# Patient Record
Sex: MALE | Race: WHITE | NOT HISPANIC OR LATINO | Employment: FULL TIME | URBAN - METROPOLITAN AREA
[De-identification: names, ages, dates, MRNs, and addresses within clinical notes are randomized per-mention and may not be internally consistent; named-entity substitution may affect disease eponyms.]

---

## 2022-12-22 ENCOUNTER — ANCILLARY PROCEDURE (OUTPATIENT)
Dept: GENERAL RADIOLOGY | Facility: CLINIC | Age: 32
End: 2022-12-22
Attending: NURSE PRACTITIONER
Payer: COMMERCIAL

## 2022-12-22 ENCOUNTER — OFFICE VISIT (OUTPATIENT)
Dept: FAMILY MEDICINE | Facility: CLINIC | Age: 32
End: 2022-12-22
Payer: COMMERCIAL

## 2022-12-22 VITALS
RESPIRATION RATE: 16 BRPM | HEART RATE: 84 BPM | DIASTOLIC BLOOD PRESSURE: 80 MMHG | OXYGEN SATURATION: 98 % | WEIGHT: 257.2 LBS | BODY MASS INDEX: 34.09 KG/M2 | HEIGHT: 73 IN | SYSTOLIC BLOOD PRESSURE: 142 MMHG

## 2022-12-22 DIAGNOSIS — S89.81XD HYPEREXTENSION INJURY OF RIGHT KNEE, SUBSEQUENT ENCOUNTER: Primary | ICD-10-CM

## 2022-12-22 DIAGNOSIS — M25.561 RIGHT KNEE PAIN: ICD-10-CM

## 2022-12-22 PROBLEM — J30.2 SEASONAL ALLERGIC RHINITIS: Status: ACTIVE | Noted: 2022-12-22

## 2022-12-22 PROBLEM — K21.9 ACID REFLUX: Status: ACTIVE | Noted: 2022-12-22

## 2022-12-22 PROBLEM — E03.9 HYPOTHYROIDISM: Status: ACTIVE | Noted: 2022-12-22

## 2022-12-22 PROCEDURE — 90471 IMMUNIZATION ADMIN: CPT | Performed by: NURSE PRACTITIONER

## 2022-12-22 PROCEDURE — 99203 OFFICE O/P NEW LOW 30 MIN: CPT | Mod: 25 | Performed by: NURSE PRACTITIONER

## 2022-12-22 PROCEDURE — 73560 X-RAY EXAM OF KNEE 1 OR 2: CPT | Mod: TC | Performed by: RADIOLOGY

## 2022-12-22 PROCEDURE — 90715 TDAP VACCINE 7 YRS/> IM: CPT | Performed by: NURSE PRACTITIONER

## 2022-12-22 PROCEDURE — 90686 IIV4 VACC NO PRSV 0.5 ML IM: CPT | Performed by: NURSE PRACTITIONER

## 2022-12-22 PROCEDURE — 90472 IMMUNIZATION ADMIN EACH ADD: CPT | Performed by: NURSE PRACTITIONER

## 2022-12-22 RX ORDER — LEVOTHYROXINE SODIUM 25 UG/1
TABLET ORAL
COMMUNITY
Start: 2022-10-01

## 2022-12-22 ASSESSMENT — PAIN SCALES - GENERAL: PAINLEVEL: MODERATE PAIN (5)

## 2022-12-22 NOTE — PATIENT INSTRUCTIONS
X-ray looks good  MRI ordered for further evaluation.  Make appointment with radiology at [phone].  I will notify you of results when the radiologist reads the films.  Use compression sleeve, ice/heat as needed, rest and elevation if swelling.

## 2022-12-22 NOTE — PROGRESS NOTES
Assessment & Plan     (S89.81XD) Hyperextension injury of right knee, subsequent encounter  (primary encounter diagnosis)  Comment: Patient recently moved from [place] so no available records of knee injury. Patient stated that when he initially injured his knee, he had an x-ray completed which was normal. Unable to access these records at this time. Because pain has not improved, and no access to previous records, standing right knee x-ray was completed which appeared normal besides a noted bone spur to medial tibia which patient appearts to have previously have knowledge about. MRI without contrast ordered for further evaluation. Educated patient on the use of heat or ice to help relieve symptoms along with applying a compression sleeve to know. Also encouraged patient to rest and elevate extremity. Will follow up with patient regarding MRI results once completed.   Plan: MR Knee Right w/o Contrast; XR KNEE STANDING RIGHT 2 VIEWS [Ordered]    Return if symptoms worsen or fail to improve.    Neela Awan, FNP-Student    Henny Marcus NP  [facility]    I, Henny Marcus, was present with the NP student who participated in the service and in the documentationof the note.   I have verified the history and personally performed the physical exam and medical decision making.  I agree with the assessment and plan of care as documented in the note.     Henny Marcus NP on 12/22/2022 at 2:38 PM      Jimmie De La Rosa is a 32 year old, presenting for the following health issues:  Knee Pain      History of Present Illness       Reason for visit:  Knee injury  Symptom onset:  More than a month  Symptoms include:  Knee pain  Symptom intensity:  Moderate  Symptom progression:  Staying the same  Had these symptoms before:  No  What makes it worse:  Stairs sitting laying down applying force while bent    He eats 0-1 servings of fruits and vegetables daily.He consumes 0 sweetened beverage(s)  "daily.He exercises with enough effort to increase his heart rate 9 or less minutes per day.  He exercises with enough effort to increase his heart rate 3 or less days per week. He is missing 2 dose(s) of medications per week.     Hyperextended right knee in August while dirt biking. [place] a pop. Had x-rays done at that time in [place] that showed nothing significant (unable to access records at this time). Was on crutches for a week and still has pain going down stairs. The pain is diffuse and Hurt when lying and in bed, wakes up from the pain. Worst when he is walking down the stairs. Nothing has made it feel better but hasn't done any supportive measures for his knee. Reports he cannot completely flex knee but declines redness, swelling or warmth to the knee. Dislocated right knee in the past. Never had knee surgery on it. Declines feeling instability or crunching of his knee.     Review of Systems   CONSTITUTIONAL: NEGATIVE for fever, chills, change in weight  MUSCULOSKELETAL:POSITIVE  for joint pain right knee      Objective    BP (!) 142/80   Pulse 84   Resp 16   Ht 1.854 m (6' 1\")   Wt 116.7 kg (257 lb 3.2 oz)   SpO2 98%   BMI 33.93 kg/m    Body mass index is 33.93 kg/m .  Physical Exam   GENERAL: healthy, alert and no distress  MS: RLE exam shows no evidence of joint instability, negative mcmurrays test, anterior and posterior drawer test, vargus and valgus. No warmth, swelling or redness noted to right knee and no tenderness with palpation.   SKIN: no suspicious lesions or rashes    Xray - Reviewed and interpreted by me.  Normal appearing knee with adequate joint spacing. Bone spur located to medial tibia.           "

## 2023-01-10 ENCOUNTER — HOSPITAL ENCOUNTER (OUTPATIENT)
Dept: MRI IMAGING | Facility: CLINIC | Age: 33
Discharge: HOME | End: 2023-01-10
Attending: NURSE PRACTITIONER | Admitting: NURSE PRACTITIONER
Payer: COMMERCIAL

## 2023-01-10 DIAGNOSIS — S89.81XD HYPEREXTENSION INJURY OF RIGHT KNEE, SUBSEQUENT ENCOUNTER: ICD-10-CM

## 2023-01-10 PROCEDURE — 73721 MRI JNT OF LWR EXTRE W/O DYE: CPT | Mod: RT

## 2023-01-10 PROCEDURE — 73721 MRI JNT OF LWR EXTRE W/O DYE: CPT | Mod: 26 | Performed by: RADIOLOGY

## 2023-01-12 ENCOUNTER — TELEPHONE (OUTPATIENT)
Dept: ORTHOPEDICS | Facility: CLINIC | Age: 33
End: 2023-01-12

## 2023-01-12 DIAGNOSIS — S83.411A COMPLETE TEAR OF MCL OF KNEE, RIGHT, INITIAL ENCOUNTER: Primary | ICD-10-CM

## 2023-01-12 DIAGNOSIS — S83.281S OTHER TEAR OF LATERAL MENISCUS OF RIGHT KNEE AS CURRENT INJURY, SEQUELA: ICD-10-CM

## 2023-01-12 DIAGNOSIS — S83.511S RUPTURE OF ANTERIOR CRUCIATE LIGAMENT OF RIGHT KNEE, SEQUELA: Primary | ICD-10-CM

## 2023-01-12 NOTE — TELEPHONE ENCOUNTER
Reviewed patient's chart prior to his appointment on 1/17/2023 with Sports Medicine - Dr Rudolph for his right knee injury.  Patient has a subacute near complete tear of the ACL with a lateral meniscus tear as well.  This young active individual that would be best managed with surgical consultation at this time.    Spoke to patient discussed above.  Recommend scheduling with Nathanael Lynch @ [facility].  Updated Orthopedic  order was placed and scheduling information provided.    Appointment with Dr Rudolph for 1/17/23 was cancelled.    Kendrick Aragon ATC

## 2023-01-20 ENCOUNTER — TRANSCRIBE ORDERS (OUTPATIENT)
Dept: OTHER | Age: 33
End: 2023-01-20

## 2023-01-20 DIAGNOSIS — Z98.890 S/P RIGHT KNEE SURGERY: Primary | ICD-10-CM

## 2023-02-12 ENCOUNTER — HEALTH MAINTENANCE LETTER (OUTPATIENT)
Age: 33
End: 2023-02-12

## 2023-03-01 ENCOUNTER — OFFICE VISIT (OUTPATIENT)
Dept: FAMILY MEDICINE | Facility: CLINIC | Age: 33
End: 2023-03-01
Payer: COMMERCIAL

## 2023-03-01 VITALS
BODY MASS INDEX: 33.4 KG/M2 | HEIGHT: 73 IN | HEART RATE: 61 BPM | DIASTOLIC BLOOD PRESSURE: 80 MMHG | OXYGEN SATURATION: 97 % | RESPIRATION RATE: 20 BRPM | TEMPERATURE: 96.9 F | WEIGHT: 252 LBS | SYSTOLIC BLOOD PRESSURE: 110 MMHG

## 2023-03-01 DIAGNOSIS — Z01.818 PREOP GENERAL PHYSICAL EXAM: Primary | ICD-10-CM

## 2023-03-01 DIAGNOSIS — E61.1 LOW IRON: ICD-10-CM

## 2023-03-01 DIAGNOSIS — E03.9 HYPOTHYROIDISM, UNSPECIFIED TYPE: ICD-10-CM

## 2023-03-01 DIAGNOSIS — S83.511D RUPTURE OF ANTERIOR CRUCIATE LIGAMENT OF RIGHT KNEE, SUBSEQUENT ENCOUNTER: ICD-10-CM

## 2023-03-01 LAB
FERRITIN SERPL-MCNC: 96 NG/ML (ref 31–409)
HGB BLD-MCNC: 16.7 G/DL (ref 13.3–17.7)
IRON BINDING CAPACITY (ROCHE): 352 UG/DL (ref 240–430)
IRON SATN MFR SERPL: 37 % (ref 15–46)
IRON SERPL-MCNC: 129 UG/DL (ref 61–157)
TSH SERPL DL<=0.005 MIU/L-ACNC: 2.62 UIU/ML (ref 0.3–4.2)

## 2023-03-01 PROCEDURE — 83550 IRON BINDING TEST: CPT | Performed by: FAMILY MEDICINE

## 2023-03-01 PROCEDURE — 84443 ASSAY THYROID STIM HORMONE: CPT | Performed by: FAMILY MEDICINE

## 2023-03-01 PROCEDURE — 83540 ASSAY OF IRON: CPT | Performed by: FAMILY MEDICINE

## 2023-03-01 PROCEDURE — 99214 OFFICE O/P EST MOD 30 MIN: CPT | Performed by: FAMILY MEDICINE

## 2023-03-01 PROCEDURE — 85018 HEMOGLOBIN: CPT | Performed by: FAMILY MEDICINE

## 2023-03-01 PROCEDURE — 82728 ASSAY OF FERRITIN: CPT | Performed by: FAMILY MEDICINE

## 2023-03-01 PROCEDURE — 36415 COLL VENOUS BLD VENIPUNCTURE: CPT | Performed by: FAMILY MEDICINE

## 2023-03-01 RX ORDER — IRON ASPGLY/C/B12/FA/CA-TH/SUC 70-150-1MG
1 TABLET ORAL DAILY
COMMUNITY

## 2023-03-01 ASSESSMENT — PAIN SCALES - GENERAL: PAINLEVEL: NO PAIN (0)

## 2023-03-01 NOTE — PATIENT INSTRUCTIONS
Lab work today.     Follow up after surgery for annual exam    Best of luck with upcoming surgery.         For informational purposes only. Not to replace the advice of your health care provider. Copyright   ,  [facility] [employer] [facility]. All rights reserved. Clinically reviewed by Jody Allen MD. [employer] [id number] - REV .  Preparing for Your Surgery  Getting started  A nurse will call you to review your health history and instructions. They will give you an arrival time based on your scheduled surgery time. Please be ready to share:  Your doctor's clinic name and phone number  Your medical, surgical, and anesthesia history  A list of allergies and sensitivities  A list of medicines, including herbal treatments and over-the-counter drugs  Whether the patient has a legal guardian (ask how to send us the papers in advance)  Please tell us if you're pregnant--or if there's any chance you might be pregnant. Some surgeries may injure a fetus (unborn baby), so they require a pregnancy test. Surgeries that are safe for a fetus don't always need a test, and you can choose whether to have one.   If you have a child who's having surgery, please ask for a copy of Preparing for Your Child's Surgery.    Preparing for surgery  Within 10 to 30 days of surgery: Have a pre-op exam (sometimes called an H&P, or History and Physical). This can be done at a clinic or pre-operative center.  If you're having a , you may not need this exam. Talk to your care team.  At your pre-op exam, talk to your care team about all medicines you take. If you need to stop any medicines before surgery, ask when to start taking them again.  We do this for your safety. Many medicines can make you bleed too much during surgery. Some change how well surgery (anesthesia) drugs work.  Call your insurance company to let them know you're having surgery. (If you don't have insurance, call [phone].)  Call your clinic if there's any change  in your health. This includes signs of a cold or flu (sore throat, runny nose, cough, rash, fever). It also includes a scrape or scratch near the surgery site.  If you have questions on the day of surgery, call your hospital or surgery center.  Eating and drinking guidelines  For your safety: Unless your surgeon tells you otherwise, follow the guidelines below.  Eat and drink as usual until 8 hours before you arrive for surgery. After that, no food or milk.  Drink clear liquids until 2 hours before you arrive. These are liquids you can see through, like water, Gatorade, and Propel Water. They also include plain black coffee and tea (no cream or milk), candy, and breath mints. You can spit out gum when you arrive.  If you drink alcohol: Stop drinking it the night before surgery.  If your care team tells you to take medicine on the morning of surgery, it's okay to take it with a sip of water.  Preventing infection  Shower or bathe the night before and morning of your surgery. Follow the instructions your clinic gave you. (If no instructions, use regular soap.)  Don't shave or clip hair near your surgery site. We'll remove the hair if needed.  Don't smoke or vape the morning of surgery. You may chew nicotine gum up to 2 hours before surgery. A nicotine patch is okay.  Note: Some surgeries require you to completely quit smoking and nicotine. Check with your surgeon.  Your care team will make every effort to keep you safe from infection. We will:  Clean our hands often with soap and water (or an alcohol-based hand rub).  Clean the skin at your surgery site with a special soap that kills germs.  Give you a special gown to keep you warm. (Cold raises the risk of infection.)  Wear special hair covers, masks, gowns and gloves during surgery.  Give antibiotic medicine, if prescribed. Not all surgeries need antibiotics.  What to bring on the day of surgery  Photo ID and insurance card  Copy of your health care directive, if  you have one  Glasses and hearing aids (bring cases)  You can't wear contacts during surgery  Inhaler and eye drops, if you use them (tell us about these when you arrive)  CPAP machine or breathing device, if you use them  A few personal items, if spending the night  If you have . . .  A pacemaker, ICD (cardiac defibrillator) or other implant: Bring the ID card.  An implanted stimulator: Bring the remote control.  A legal guardian: Bring a copy of the certified (court-stamped) guardianship papers.  Please remove any jewelry, including body piercings. Leave jewelry and other valuables at home.  If you're going home the day of surgery  You must have a responsible adult drive you home. They should stay with you overnight as well.  If you don't have someone to stay with you, and you aren't safe to go home alone, we may keep you overnight. Insurance often won't pay for this.  After surgery  If it's hard to control your pain or you need more pain medicine, please call your surgeon's office.  Questions?   If you have any questions for your care team, list them here: _________________________________________________________________________________________________________________________________________________________________________ ____________________________________ ____________________________________ ____________________________________

## 2023-03-23 ENCOUNTER — HOSPITAL ENCOUNTER (OUTPATIENT)
Dept: PHYSICAL THERAPY | Facility: CLINIC | Age: 33
Setting detail: THERAPIES SERIES
Discharge: HOME | End: 2023-03-23
Attending: ORTHOPAEDIC SURGERY
Payer: COMMERCIAL

## 2023-03-23 PROCEDURE — 97161 PT EVAL LOW COMPLEX 20 MIN: CPT | Mod: GP | Performed by: PHYSICAL THERAPIST

## 2023-03-23 PROCEDURE — 97110 THERAPEUTIC EXERCISES: CPT | Mod: GP | Performed by: PHYSICAL THERAPIST

## 2023-03-23 NOTE — PROGRESS NOTES
03/23/23 1300   General Information   Type of Visit Initial OP Ortho PT Evaluation   Start of Care Date 03/23/23   Referring Physician Adiel Simmons   Patient/Family Goals Statement back to dirt biking.   Orders Evaluate and Treat   Date of Order 01/19/23   Medical Diagnosis s/p right ACL reconstruction hamstring Allograft 3/20/23   Surgical/Medical history reviewed Yes   Precautions/Limitations no known precautions/limitations   General Information Comments PMH:  none   Body Part(s)   Body Part(s) Knee   Presentation and Etiology   Pertinent history of current problem (include personal factors and/or comorbidities that impact the POC) Dirt biking  in [facility] and tore his ACL back in September. Was doing some exercises and biking before surgery to strengthen things up.  Had surgery on the 20th for ACL repair.  Had a Hamstring Allograft.  Surgery went well. No signs of blood clots.   Impairments A. Pain;B. Decreased WB tolerance;C. Swelling;D. Decreased ROM;E. Decreased flexibility;F. Decreased strength and endurance;H. Impaired gait   Functional Limitations perform activities of daily living;perform desired leisure / sports activities   Symptom Location right knee   How/Where did it occur During recreation/sports   Onset date of current episode/exacerbation 03/20/23   Chronicity New   Pain rating (0-10 point scale) Best (/10);Worst (/10)   Best (/10) 1   Worst (/10) 6   Pain quality A. Sharp;C. Aching   Frequency of pain/symptoms B. Intermittent   Pain/symptoms are: Worse during the day   Progression of symptoms since onset: Improved   Current Level of Function   Patient role/employment history A. Employed   Employment Comments    Fall Risk Screen   Fall screen completed by PT   Have you fallen 2 or more times in the past year? No   Have you fallen and had an injury in the past year? No   Is patient a fall risk? No   Abuse Screen (yes response referral indicated)   Feels Unsafe at Home or Work/School no    Feels Threatened by Someone no   Does Anyone Try to Keep You From Having Contact with Others or Doing Things Outside Your Home? no   Physical Signs of Abuse Present no   Functional Scales   Functional Scales Other   Other Scales  LEFS   Knee Objective Findings   Gait/Locomotion using crutch in left hand, step through gait pattern WBAT, good heel/toe pattern with knee flexion during swing phase.  Pt does relate it is buckling still at times.   Integumentary  incisions healing well, steri strips present with dried blood around steristrips. No apparent bruising noted today   Side (if bilateral, select both right and left) Right   Right Knee Extension AROM 4 from 0   Right Knee Flexion AROM 78   Right Knee Flexion Strength 2/5   Right Knee Extension Strength 2/5   Right Quad Set Strength fair isometric contraction   Right Gastrocnemius Flexibility mod tightness right   Right Hamstring Flexibility mod tightness right   Planned Therapy Interventions   Planned Therapy Interventions balance training;bed mobility training;gait training;joint mobilization;manual therapy;neuromuscular re-education;ROM;strengthening;stretching;transfer training   Planned Modality Interventions   Planned Modality Interventions Electrical stimulation   Clinical Impression   Criteria for Skilled Therapeutic Interventions Met yes, treatment indicated   PT Diagnosis decreased right knee ROM s/p ACL repair 3/20   Influenced by the following impairments decreased right knee ROM and strength, pain, post op swelling,   Functional limitations due to impairments walking, stairs, squatting, lifting, pusihng, dirt biking   Clinical Presentation Stable/Uncomplicated   Clinical Presentation Rationale clinical decision making and chart review   Clinical Decision Making (Complexity) Low complexity   Therapy Frequency 1 time/week   Predicted Duration of Therapy Intervention (days/wks) 12 weeks   Risk & Benefits of therapy have been explained Yes   Patient,  Family & other staff in agreement with plan of care Yes   Clinical Impression Comments Rj is a 32 year old male who presents to PT s/p RIght ACL reconstruction hamstring allograft 3/20/23. Patient would benefit from skilled PT intervention to improve impairments listed above. PT prognosis is great due to pt age, motivation and current function 3 days post op.   Education Assessment   Preferred Learning Style Listening;Demonstration;Pictures/video   Barriers to Learning No barriers   ORTHO GOALS   PT Ortho Eval Goals 1;2;3;4;5;6   Ortho Goal 1   Goal Identifier 1   Goal Description Patient will improve right knee ROM to 0-135 in order to restore normal ROM.   Target Date 04/20/23   Ortho Goal 2   Goal Identifier 2   Goal Description Patient will be able to complete SLR with no knee ext lag x 10 in order to improve strength for normal gait.   Target Date 04/20/23   Ortho Goal 3   Goal Identifier 3   Goal Description Patient will be able to ambulate with normal heel/toe pattern and no crutches with no buckling in the right knee.   Target Date 04/20/23   Ortho Goal 4   Goal Identifier 4   Goal Description Patient will improve right knee ext strength to 5-/5 in order to return to daily activities and recreational activites such as dirt biking.   Target Date 06/01/23   Ortho Goal 5   Goal Identifier 5   Goal Description Patient will be able to ascend/descend flight of stairs with recirpocal gait pattern and no railing.   Target Date 06/01/23   Ortho Goal 6   Goal Identifier 6   Goal Description Patient will test within 85% of left leg in return to fitness level 1 and Level 2 functional performance testing (SL stand and reach, SL balance, SL squat, Retro step, star excursion, SL squat SL hop)   Target Date 06/15/23   Total Evaluation Time   PT Eval, Low Complexity Minutes (70531) 8     Lynsey Sweeney  PT, DPT       3/23/2023   [address] [address]    [email or web address]  Voicemail: [phone]

## 2023-03-30 ENCOUNTER — HOSPITAL ENCOUNTER (OUTPATIENT)
Dept: PHYSICAL THERAPY | Facility: CLINIC | Age: 33
Setting detail: THERAPIES SERIES
Discharge: HOME | End: 2023-03-30
Attending: ORTHOPAEDIC SURGERY
Payer: COMMERCIAL

## 2023-03-30 PROCEDURE — 97116 GAIT TRAINING THERAPY: CPT | Mod: GP | Performed by: PHYSICAL THERAPIST

## 2023-03-30 PROCEDURE — 97110 THERAPEUTIC EXERCISES: CPT | Mod: GP | Performed by: PHYSICAL THERAPIST

## 2023-04-06 ENCOUNTER — HOSPITAL ENCOUNTER (OUTPATIENT)
Dept: PHYSICAL THERAPY | Facility: CLINIC | Age: 33
Setting detail: THERAPIES SERIES
Discharge: HOME | End: 2023-04-06
Attending: ORTHOPAEDIC SURGERY
Payer: COMMERCIAL

## 2023-04-06 PROCEDURE — 97110 THERAPEUTIC EXERCISES: CPT | Mod: GP | Performed by: PHYSICAL THERAPIST

## 2023-04-20 ENCOUNTER — HOSPITAL ENCOUNTER (OUTPATIENT)
Dept: PHYSICAL THERAPY | Facility: CLINIC | Age: 33
Setting detail: THERAPIES SERIES
Discharge: HOME | End: 2023-04-20
Attending: ORTHOPAEDIC SURGERY
Payer: COMMERCIAL

## 2023-04-20 PROCEDURE — 97110 THERAPEUTIC EXERCISES: CPT | Mod: GP | Performed by: PHYSICAL THERAPIST

## 2023-04-27 ENCOUNTER — HOSPITAL ENCOUNTER (OUTPATIENT)
Dept: PHYSICAL THERAPY | Facility: CLINIC | Age: 33
Setting detail: THERAPIES SERIES
Discharge: HOME | End: 2023-04-27
Attending: ORTHOPAEDIC SURGERY
Payer: COMMERCIAL

## 2023-04-27 PROCEDURE — 97110 THERAPEUTIC EXERCISES: CPT | Mod: GP | Performed by: PHYSICAL THERAPIST

## 2023-05-04 ENCOUNTER — HOSPITAL ENCOUNTER (OUTPATIENT)
Dept: PHYSICAL THERAPY | Facility: CLINIC | Age: 33
Setting detail: THERAPIES SERIES
Discharge: HOME | End: 2023-05-04
Attending: ORTHOPAEDIC SURGERY
Payer: COMMERCIAL

## 2023-05-04 PROCEDURE — 97110 THERAPEUTIC EXERCISES: CPT | Mod: GP | Performed by: PHYSICAL THERAPIST

## 2023-05-04 NOTE — PROGRESS NOTES
[facility] Rehabilitation Service    Outpatient Physical Therapy Progress Note  Patient: Rj Phillips  : 1990    Beginning/End Dates of Reporting Period:  3/23/23 to 23    Referring Provider: Adiel Muniz Diagnosis: decreased right knee ROM s/p ACL repair 3/20/23     Client Self Report: Knee is doing well.  IF he does too much one day, will notice some increased soreness the next day but better after a day of taking it easy.  Strength feels it's coming along nicely.  Algerian deadlift starting to feel easy now. Can go down the stairs one after another now.    Objective Measurements:  Objective Measure: right knee ROM  Details: 5-0-143  Objective Measure: SL bridge  Details: R 23  L 30  Objective Measure: SL heel raises  Details: right: 27,  left 30  Objective Measure: SL balance  Details: right: 60 seconds   Left: 60 seconds  Objective Measure: single leg stand and reach  Details: right leg: anterior/medial 24 icnhes  anterior/lateral 23 inches     Left leg: anterior medial 27 inches  anterior/lateral 27 inches     Goals:  Goal Identifier 1   Goal Description Patient will improve right knee ROM to 0-135 in order to restore normal ROM.   Target Date 23   Date Met  23   Progress (detail required for progress note):       Goal Identifier 2   Goal Description Patient will be able to complete SLR with no knee ext lag x 10 in order to improve strength for normal gait.   Target Date 23   Date Met  23   Progress (detail required for progress note):       Goal Identifier 3   Goal Description Patient will be able to ambulate with normal heel/toe pattern and no crutches with no buckling in the right knee.   Target Date 23   Date Met  23   Progress (detail required for progress note):       Goal Identifier 4   Goal Description Patient will improve right knee ext strength to 5-/5 in order  to return to daily activities and recreational activites such as dirt biking.   Target Date 06/01/23   Date Met      Progress (detail required for progress note):       Goal Identifier 5   Goal Description Patient will be able to ascend/descend flight of stairs with recirpocal gait pattern and no railing.   Target Date 06/01/23   Date Met      Progress (detail required for progress note):       Goal Identifier 6   Goal Description Patient will test within 85% of left leg in return to fitness level 1 and Level 2 functional performance testing (SL stand and reach, SL balance, SL squat, Retro step, star excursion, SL squat SL hop)   Target Date 06/15/23   Date Met      Progress (detail required for progress note):         Plan:  Continue therapy per current plan of care.  Rj's ROM is looking great and back to WNL.  HIs strength is progressing along well.  Balance is symmetrical with SL balance, Strength testing today showed more weakness on the R than the L with SL bridge, lunges, and SL stand and reach testing. Patient would benefit from continued PT for further strengt training. Has f/u with MD on 5/8    Discharge:  No    Lynsey Sweeney  PT, DPT       5/4/2023   [address] [address]   [email or web address]  Voicemail: [phone]

## 2023-05-18 ENCOUNTER — THERAPY VISIT (OUTPATIENT)
Dept: PHYSICAL THERAPY | Facility: CLINIC | Age: 33
End: 2023-05-18
Attending: ORTHOPAEDIC SURGERY
Payer: COMMERCIAL

## 2023-05-18 DIAGNOSIS — Z98.890 S/P RIGHT KNEE SURGERY: ICD-10-CM

## 2023-05-18 PROCEDURE — 97110 THERAPEUTIC EXERCISES: CPT | Mod: GP | Performed by: PHYSICAL THERAPIST

## 2023-06-15 ENCOUNTER — THERAPY VISIT (OUTPATIENT)
Dept: PHYSICAL THERAPY | Facility: CLINIC | Age: 33
End: 2023-06-15
Attending: ORTHOPAEDIC SURGERY
Payer: COMMERCIAL

## 2023-06-15 DIAGNOSIS — Z98.890 S/P RIGHT KNEE SURGERY: Primary | ICD-10-CM

## 2023-06-15 PROCEDURE — 97110 THERAPEUTIC EXERCISES: CPT | Mod: GP | Performed by: PHYSICAL THERAPIST

## 2023-06-26 PROBLEM — Z98.890 S/P RIGHT KNEE SURGERY: Status: RESOLVED | Noted: 2023-05-18 | Resolved: 2023-06-26

## 2023-06-26 NOTE — PROGRESS NOTES
PHYSICAL THERAPY DISCHARGE NOTE    Assessment:  Pt is back to completing all his normal activities again. He has no desire to get back to sports related activities other than dirt biking. He plans to keep working on his strength and power independently with his HEP. Will plan for patient to return only if needed and if further issues with knee with return to normal activities.      DISCHARGE  Reason for Discharge: Patient has met all goals.    Equipment Issued: theraband    Discharge Plan: Patient to continue home program.    Referring Provider:  Adiel Simmons       06/15/23 0500   Appointment Info   Signing clinician's name / credentials Lynsey Sweeney, PT DPT   Visits Used 8   Medical Diagnosis s/p right ACL reconstruction 3/20/23   PT Tx Diagnosis decreased right knee ROM   Progress Note/Certification   Progress Note Due Date 06/15/23   GOALS   PT Goals 2;3;4;5;6   PT Goal 1   Goal Identifier 1   Goal Description Patient will improve right knee ROM to 0-135 in order to restore normal ROM.   Target Date 04/20/23   Date Met 04/20/23   PT Goal 2   Goal Identifier 2   Goal Description Patient will be able to complete SLR with no knee ext lag x 10 in order to improve strength for normal gait.   Target Date 04/20/23   Date Met 04/20/23   PT Goal 3   Goal Identifier 3   Goal Description Patient will be able to ambulate with normal heel/toe pattern and no crutches with no buckling in the right knee.   Target Date 04/20/23   Date Met 04/20/23   PT Goal 4   Goal Identifier 4   Goal Description Patient will improve right knee ext strength to 5-/5 in order to return to daily activities and recreational activites such as dirt biking.   Target Date 06/01/23   Date Met 06/15/23   PT Goal 5   Goal Identifier 5   Goal Description Patient will be able to ascend/descend flight of stairs with recirpocal gait pattern and no railing.   Target Date 06/01/23   Date Met 06/15/23   PT Goal 6   Goal Identifier 6   Goal Description  Patient will test within 85% of left leg in return to fitness level 1 and Level 2 functional performance testing (SL stand and reach, SL balance, SL squat, Retro step, star excursion, SL squat SL hop)   Target Date 06/15/23   Subjective Report   Subjective Report He hasn't been as good about doing his exercises this past month.  Has been back to riding his dirt bike and that's been going well.   Objective Measures   Objective Measures Objective Measure 1;Objective Measure 2;Objective Measure 3;Objective Measure 4;Objective Measure 5   Objective Measure 1   Objective Measure jumping   Details lands even and pushes oven even   Objective Measure 2   Objective Measure SL hops on right   Details hips adducts on right with landing, okay form at knee, able to correct with cueing   Objective Measure 3   Objective Measure jogging   Details WNL   Objective Measure 4   Objective Measure sprinting   Details right knee vahe but no pain   Treatment Interventions (PT)   Interventions Therapeutic Procedure/Exercise;Gait Training   Therapeutic Procedure/Exercise   Therapeutic Procedures: strength, endurance, ROM, flexibillity minutes (86849) 26   Therapeutic Procedures Ther Proc 2;Ther Proc 3   Ther Proc 1 bike level 4 x 3 minutes   Ther Proc 2 jogging   Ther Proc 2 - Details jogging in gym with good form x 60 seconds for strengthening   Ther Proc 3 Monster Walk   Ther Proc 3 - Details gtb x 30 feet forward   PTRx Ther Proc 1 Star Exercise   PTRx Ther Proc 1 - Details verbally reviewed to continue with   PTRx Ther Proc 2 Prone Plank   PTRx Ther Proc 2 - Details verbally reviewed to continue with   PTRx Ther Proc 3 Double Leg Cone Jumps   PTRx Ther Proc 3 - Details 3 x 10 with cues on lanidng back on heels   PTRx Ther Proc 4 Single Leg Hop Forward   PTRx Ther Proc 4 - Details 3 x 5 on right with cues to keep hips level and land with weight shifted back on heel   PTRx Ther Proc 5 Functional Lunge Forward   PTRx Ther Proc 5 -  Details ed to complete lunges and push back rather than walking lunges   PTRx Ther Proc 6 squat jumps   PTRx Ther Proc 6 - Details 3 x 10 with cues to land with weight on heels   Skilled Intervention progress HEP into strengthening program   Patient Response/Progress understood, good form and understanding, fatigue by end of session.   Ther Proc 1 - Details instructed patient to do short sprints on bike under higher resistance to improve power   Education   Learner/Method Patient   Education Comments educated on role of PT, POC and HEP   Plan   Home program PTrX code in snap shot   Plan for next session Pt is back to completing all his normal activities again. He has no desire to get back to sports related activities other than dirt biking. He plans to keep working on his strength and power independently with his HEP. Will plan for patient to return only if needed and if further issues with knee with return to normal activities.   Comments   Comments d/c chart in 30 days   Total Session Time   Timed Code Treatment Minutes 26   Total Treatment Time (sum of timed and untimed services) 26   Medicare Claim Information   Medical Diagnosis s/p right ACL reconstruction hamstring Allograft 3/20/23   PT Diagnosis decreased right knee ROM s/p ACL repair 3/20   Start of Care Date 03/23/23   Onset date of current episode/exacerbation 03/20/23   Ortho Goal 1   Goal Identifier 1   Goal Description Patient will improve right knee ROM to 0-135 in order to restore normal ROM.   Target Date 04/20/23   Date Met 04/20/23   Ortho Goal 2   Goal Identifier 2   Goal Description Patient will be able to complete SLR with no knee ext lag x 10 in order to improve strength for normal gait.   Target Date 04/20/23   Date Met 04/20/23   Ortho Goal 3   Goal Identifier 3   Goal Description Patient will be able to ambulate with normal heel/toe pattern and no crutches with no buckling in the right knee.   Target Date 04/20/23   Date Met 04/20/23    Ortho Goal 4   Goal Identifier 4   Goal Description Patient will improve right knee ext strength to 5-/5 in order to return to daily activities and recreational activites such as dirt biking.   Target Date 06/01/23   Ortho Goal 5   Goal Identifier 5   Goal Description Patient will be able to ascend/descend flight of stairs with recirpocal gait pattern and no railing.   Target Date 06/01/23   Ortho Goal 6   Goal Identifier 6   Goal Description Patient will test within 85% of left leg in return to fitness level 1 and Level 2 functional performance testing (SL stand and reach, SL balance, SL squat, Retro step, star excursion, SL squat SL hop)   Target Date 06/15/23   Session Number   Authorization status auth         Lynsey Sweeney  PT, DPT       6/26/2023   [address] [address]   [email or web address]  Voicemail: [phone]

## 2023-08-08 ENCOUNTER — OFFICE VISIT (OUTPATIENT)
Dept: FAMILY MEDICINE | Facility: CLINIC | Age: 33
End: 2023-08-08
Payer: COMMERCIAL

## 2023-08-08 VITALS
WEIGHT: 238 LBS | OXYGEN SATURATION: 97 % | HEIGHT: 73 IN | HEART RATE: 75 BPM | BODY MASS INDEX: 31.54 KG/M2 | RESPIRATION RATE: 16 BRPM | TEMPERATURE: 98.5 F | DIASTOLIC BLOOD PRESSURE: 84 MMHG | SYSTOLIC BLOOD PRESSURE: 130 MMHG

## 2023-08-08 DIAGNOSIS — Z30.09 SCREENING AND EVALUATION FOR VASECTOMY: ICD-10-CM

## 2023-08-08 DIAGNOSIS — Z20.822 EXPOSURE TO 2019 NOVEL CORONAVIRUS: ICD-10-CM

## 2023-08-08 DIAGNOSIS — L81.9 PIGMENTED SKIN LESION: Primary | ICD-10-CM

## 2023-08-08 DIAGNOSIS — L91.8 SKIN TAGS, MULTIPLE ACQUIRED: ICD-10-CM

## 2023-08-08 DIAGNOSIS — Z80.8 FAMILY HISTORY OF MALIGNANT MELANOMA: ICD-10-CM

## 2023-08-08 PROCEDURE — 99213 OFFICE O/P EST LOW 20 MIN: CPT | Performed by: FAMILY MEDICINE

## 2023-08-08 ASSESSMENT — PAIN SCALES - GENERAL: PAINLEVEL: NO PAIN (0)

## 2023-08-08 NOTE — PATIENT INSTRUCTIONS
Referrals to dermatology and urology have been signed. Schedulers will call you in the next 3-5 business days.     Check with insurance about coverage for CRYOTHERAPY for the skin tags - schedule procedure appointment if you would like to pursue treatment.

## 2023-08-08 NOTE — PROGRESS NOTES
"  Assessment & Plan     Pigmented skin lesion - both lower legs  Low suspicion for melanoma but with family history will consult derm for a more thorough skin exam.  - Adult Dermatology Referral    Family history of malignant melanoma  - Adult Dermatology Referral    Skin tags, multiple acquired - perineal and right inner thigh  Other than \"annoying\" patient has no concerns about these.  Discussed variable coverage for removal. He preferred to check with insurance first about cryotherapy.  Discouraged snipping off the tags due to location being prone to secondary infection.    Screening and evaluation for vasectomy  Advised urologist consult with patient first to discuss procedure and screen if appropriate. Patient agreed to referral.  - Adult Urology  Referral    Exposure to Covid-19  Patient declined to address this today in spite of him having some symptoms.    Patient Instructions   Referrals to dermatology and urology have been signed. Schedulers will call you in the next 3-5 business days.     Check with insurance about coverage for CRYOTHERAPY for the skin tags - schedule procedure appointment if you would like to pursue treatment.      Suhail Landa MD  [facility]    Jimmie De La Rosa is a 33 year old, presenting for the following health issues:  Skin Tags (Pt would like to have skin tags in groin area, and left knee, back side of right arm. And moles along the hair line. )        8/8/2023     2:50 PM   Additional Questions   Roomed by Debbie ARMENDARIZ MA   Accompanied by Self         8/8/2023     2:50 PM   Patient Reported Additional Medications   Patient reports taking the following new medications None       History of Present Illness       Reason for visit:  Skin tags    He eats 2-3 servings of fruits and vegetables daily.He consumes 0 sweetened beverage(s) daily.He exercises with enough effort to increase his heart rate 60 or more minutes per day.  He exercises with enough " "effort to increase his heart rate 3 or less days per week.   He is taking medications regularly.       Skin tags  Onset/Duration: x several years  Description  Location: Groin, Left leg, right arm and neck  Color: brown and skin colored  Border description: raised  Character: Protrude   Itching: no  Bleeding:  No  Intensity:  mild  Progression of Symptoms:  same  Accompanying signs and symptoms:   Bleeding: No  Scaling: No  Excessive sun exposure/tanning: No  Sunscreen used: No  History:           Any previous history of skin cancer: No  Any family history of melanoma: YES, father  Previous episodes of similar lesion: No  Therapies tried and outcome: none            Review of Systems   Constitutional, HEENT, cardiovascular, pulmonary, GI, , musculoskeletal, neuro, skin, endocrine and psych systems are negative, except as otherwise noted.      Objective    /84 (BP Location: Left arm, Patient Position: Sitting, Cuff Size: Adult Large)   Pulse 75   Temp 98.5  F (36.9  C) (Tympanic)   Resp 16   Ht 1.854 m (6' 1\")   Wt 108 kg (238 lb)   SpO2 97%   BMI 31.40 kg/m    Body mass index is 31.4 kg/m .  Physical Exam   GEN: alert, oriented x 3, NAD, no cough  SKIN: good turgor; two non-inflamed skin tags one each on perineum and proximal right inner thigh; light brown papule on the left lateral knee; multiple small brown macules on calves    No results found for any visits on 08/08/23.                "

## 2023-09-26 ENCOUNTER — OFFICE VISIT (OUTPATIENT)
Dept: DERMATOLOGY | Facility: CLINIC | Age: 33
End: 2023-09-26
Payer: COMMERCIAL

## 2023-09-26 DIAGNOSIS — L91.8 SKIN TAG: ICD-10-CM

## 2023-09-26 DIAGNOSIS — D18.01 ANGIOMA OF SKIN: ICD-10-CM

## 2023-09-26 DIAGNOSIS — Z80.8 FAMILY HISTORY OF MALIGNANT MELANOMA: Primary | ICD-10-CM

## 2023-09-26 DIAGNOSIS — D23.9 DERMAL NEVUS: ICD-10-CM

## 2023-09-26 DIAGNOSIS — D22.9 MULTIPLE BENIGN NEVI: ICD-10-CM

## 2023-09-26 PROCEDURE — 99243 OFF/OP CNSLTJ NEW/EST LOW 30: CPT | Mod: 25 | Performed by: DERMATOLOGY

## 2023-09-26 PROCEDURE — 11200 RMVL SKIN TAGS UP TO&INC 15: CPT | Performed by: DERMATOLOGY

## 2023-09-26 NOTE — PROGRESS NOTES
Rj Phillips , a 33 year old year old male patient, I was asked to see by Dr. Landa for spots on neck, groin and r arm. Patient has no other skin complaints today.  Remainder of the HPI, Meds, PMH, Allergies, FH, and SH was reviewed in chart.    No past medical history on file.    Past Surgical History:   Procedure Laterality Date    NISSEN FUNDOPLICATION      ROTATOR CUFF REPAIR RT/LT Right         No family history on file.    Social History     Socioeconomic History    Marital status: Single     Spouse name: Not on file    Number of children: Not on file    Years of education: Not on file    Highest education level: Not on file   Occupational History    Not on file   Tobacco Use    Smoking status: Never    Smokeless tobacco: Current     Types: Chew   Vaping Use    Vaping Use: Never used   Substance and Sexual Activity    Alcohol use: Not on file    Drug use: Not on file    Sexual activity: Not on file   Other Topics Concern    Not on file   Social History Narrative    Not on file     Social Determinants of Health     Financial Resource Strain: Not on file   Food Insecurity: Not on file   Transportation Needs: Not on file   Physical Activity: Not on file   Stress: Not on file   Social Connections: Not on file   Interpersonal Safety: Not on file   Housing Stability: Not on file       Outpatient Encounter Medications as of 9/26/2023   Medication Sig Dispense Refill    levothyroxine (SYNTHROID/LEVOTHROID) 25 MCG tablet  (Patient not taking: Reported on 8/8/2023)      multivitamin with iron (CHROMAGEN) TABS half-tab Take 1 tablet by mouth daily       No facility-administered encounter medications on file as of 9/26/2023.             Review Of Systems  Skin: As above  Eyes: negative  Ears/Nose/Throat: negative  Respiratory: No shortness of breath, dyspnea on exertion, cough, or hemoptysis  Cardiovascular: negative  Gastrointestinal: negative  Genitourinary: negative  Musculoskeletal: negative  Neurologic:  negative  Psychiatric: negative  Hematologic/Lymphatic/Immunologic: negative  Endocrine: negative      O:   NAD, WDWN, Alert & Oriented, Mood & Affect wnl, Vitals stable   Here today alone   General appearance ray ii   Vitals stable   Alert, oriented and in no acute distress  R psot neck red brown papule  R arm firm button hole papule   Tags in groin    Red papules on trunk   Flesh colored papules on trunk          Eyes: Conjunctivae/lids:Normal     ENT: Lips, buccal mucosa, tongue: normal    MSK:Normal    Cardiovascular: peripheral edema none    Pulm: Breathing Normal    Neuro/Psych: Orientation:Normal; Mood/Affect:Normal      A/P:  1. Benign nevus, dermatofibroma, , angioma, dermal nevus, , fmhx of melanoma   Excision discussed with patient   2. Skin tags x2 R groin  TANGENTIAL EXCISION:  After consent, prep, tangential excision performed.  No complications and routine wound care.    It was a pleasure speaking to Rj Phillips today.  Previous clinic  notes and pertinent laboratory tests were reviewed prior to Rj Phillips's visit.  Nature and genetics of benign skin lesions dicussed with patient.  Signs and Symptoms of skin cancer discussed with patient.  Patient encouraged to perform monthly skin exams.  UV precautions reviewed with patient.  Return to clinic 12 months

## 2023-09-26 NOTE — LETTER
9/26/2023         RE: Rj Phillips  [address]  [facility] [address]        Dear Colleague,    Thank you for referring your patient, Rj Phillips, to the [facility]. Please see a copy of my visit note below.    Rj Phillips , a 33 year old year old male patient, I was asked to see by Dr. Landa for spots on skin and nose.  .  Patient has no other skin complaints today.  Remainder of the HPI, Meds, PMH, Allergies, FH, and SH was reviewed in chart.    No past medical history on file.    Past Surgical History:   Procedure Laterality Date     NISSEN FUNDOPLICATION       ROTATOR CUFF REPAIR RT/LT Right         No family history on file.    Social History     Socioeconomic History     Marital status: Single     Spouse name: Not on file     Number of children: Not on file     Years of education: Not on file     Highest education level: Not on file   Occupational History     Not on file   Tobacco Use     Smoking status: Never     Smokeless tobacco: Current     Types: Chew   Vaping Use     Vaping Use: Never used   Substance and Sexual Activity     Alcohol use: Not on file     Drug use: Not on file     Sexual activity: Not on file   Other Topics Concern     Not on file   Social History Narrative     Not on file     Social Determinants of Health     Financial Resource Strain: Not on file   Food Insecurity: Not on file   Transportation Needs: Not on file   Physical Activity: Not on file   Stress: Not on file   Social Connections: Not on file   Interpersonal Safety: Not on file   Housing Stability: Not on file       Outpatient Encounter Medications as of 9/26/2023   Medication Sig Dispense Refill     levothyroxine (SYNTHROID/LEVOTHROID) 25 MCG tablet  (Patient not taking: Reported on 8/8/2023)       multivitamin with iron (CHROMAGEN) TABS half-tab Take 1 tablet by mouth daily       No facility-administered encounter medications on file as of 9/26/2023.             Review Of Systems  Skin: As  above  Eyes: negative  Ears/Nose/Throat: negative  Respiratory: No shortness of breath, dyspnea on exertion, cough, or hemoptysis  Cardiovascular: negative  Gastrointestinal: negative  Genitourinary: negative  Musculoskeletal: negative  Neurologic: negative  Psychiatric: negative  Hematologic/Lymphatic/Immunologic: negative  Endocrine: negative      O:   NAD, WDWN, Alert & Oriented, Mood & Affect wnl, Vitals stable   Here today alone   General appearance ray ii   Vitals stable   Alert, oriented and in no acute distress   Nasal bridge inflamed seborrheic keratosis   pigmented macules on trunk and ext with regular borders and pigment networks    Stuck on papules and brown macules on trunk and ext    Red papules on trunk   Flesh colored papules on trunk          Eyes: Conjunctivae/lids:Normal     ENT: Lips, buccal mucosa, tongue: normal    MSK:Normal    Cardiovascular: peripheral edema none    Pulm: Breathing Normal    Neuro/Psych: Orientation:Normal; Mood/Affect:Normal      A/P:  1. Seborrheic keratosis, lentigo, angioma, dermal nevus, nevi, fmhx of melanoma   2. Inflamed seborrheic keratosis nose   LN2:  Treated with LN2 for 5s for 1-2 cycles. Warned risks of blistering, pain, pigment change, scarring, and incomplete resolution.  Advised patient to return if lesions do not completely resolve.  Wound care sheet given.  It was a pleasure speaking to Rj Phillips today.  Previous clinic  notes and pertinent laboratory tests were reviewed prior to Rj Phillips's visit.  Nature and genetics of benign skin lesions dicussed with patient.  Signs and Symptoms of skin cancer discussed with patient.  Patient encouraged to perform monthly skin exams.  UV precautions reviewed with patient.  Return to clinic 12 months      Again, thank you for allowing me to participate in the care of your patient.        Sincerely,        Bonifacio Ferris MD

## 2023-10-12 ENCOUNTER — VIRTUAL VISIT (OUTPATIENT)
Dept: UROLOGY | Facility: CLINIC | Age: 33
End: 2023-10-12
Attending: UROLOGY
Payer: COMMERCIAL

## 2023-10-12 DIAGNOSIS — Z30.09 SCREENING AND EVALUATION FOR VASECTOMY: ICD-10-CM

## 2023-10-12 PROCEDURE — 99202 OFFICE O/P NEW SF 15 MIN: CPT | Mod: VID | Performed by: UROLOGY

## 2023-10-12 RX ORDER — FEXOFENADINE HCL 180 MG/1
TABLET ORAL
COMMUNITY

## 2023-10-12 ASSESSMENT — PAIN SCALES - GENERAL: PAINLEVEL: NO PAIN (0)

## 2023-10-12 NOTE — PROGRESS NOTES
VASECTOMY CONSULTATION NOTE  DATE OF VISIT: 10/12/2023  TOBY JON   PATIENT NAME: Rj Phillips    YOB: 1990      REASON FOR CONSULTATION: Mr. Rj Phillips is a 33 year old year old gentleman who is seen today requesting a vasectomy. He has 0 children - and he wishes to have a vasectomy for birth control.    PAST MEDICAL HISTORY: No past medical history on file.    PAST SURGICAL HISTORY:   Past Surgical History:   Procedure Laterality Date    NISSEN FUNDOPLICATION      ROTATOR CUFF REPAIR RT/LT Right        MEDICATIONS:   Current Outpatient Medications:     fexofenadine (ALLEGRA) 180 MG tablet, Take by mouth, Disp: , Rfl:     multivitamin with iron (CHROMAGEN) TABS half-tab, Take 1 tablet by mouth daily, Disp: , Rfl:     levothyroxine (SYNTHROID/LEVOTHROID) 25 MCG tablet, , Disp: , Rfl:     ALLERGIES: No Known Allergies    FAMILY HISTORY: No family history on file.    SOCIAL HISTORY:   Social History     Socioeconomic History    Marital status: Single     Spouse name: Not on file    Number of children: Not on file    Years of education: Not on file    Highest education level: Not on file   Occupational History    Not on file   Tobacco Use    Smoking status: Never    Smokeless tobacco: Current     Types: Chew   Vaping Use    Vaping Use: Never used   Substance and Sexual Activity    Alcohol use: Not on file    Drug use: Not on file    Sexual activity: Not on file   Other Topics Concern    Not on file   Social History Narrative    Not on file     Social Determinants of Health     Financial Resource Strain: Not on file   Food Insecurity: Not on file   Transportation Needs: Not on file   Physical Activity: Not on file   Stress: Not on file   Social Connections: Not on file   Interpersonal Safety: Not on file   Housing Stability: Not on file       PHYSICAL EXAM  Patient is a 33 year old  male   Vitals: There were no vitals taken for this visit.  There is no height or weight on file to calculate  BMI.  General Appearance Adult:   Alert, no acute distress, oriented  HENT: throat/mouth:normal, good dentition  Lungs: no respiratory distress, or pursed lip breathing  Heart: No obvious jugular venous distension present  Abdomen: non - distended  Musculoskeltal: extremities normal, no peripheral edema  Skin: no suspicious lesions or rashes  Neuro: Alert, oriented, speech and mentation normal  Psych: affect and mood normal  Gait: Normal     DIAGNOSIS: Request for sterilization    PLAN: The risks of the procedure as well as expectations for recovery and outcomes were explained in detail to him.  He was counseled on the risks for bleeding infection and pain after the procedure. We discussed the risk of post-vasectomy pain syndrome.  He was instructed to continue to use contraception until he had proven azoospermia on a semen specimen.  This would normally be collected at least 3 months after the procedure. Also discussed the rare, but possible risk of re-canalization of the vas, even after successful vasectomy with sterile semen specimen.  He was instructed to hold all anticoagulants medications for one week prior to the procedure.  It was recommended that he have someone else drive him home after his vasectomy.  In light of these risks and expectations he would like to proceed.  We are scheduling a vasectomy in the office in the near future.    Pt. Understands:  -1/1000-1/3000 risk of future pregnancy even with perfectly done vasectomy  -vasectomy is a permanent procedure    -he may cryopreserve sperm if he wishes   -1-5% risk of post-vasectomy pain syndrome   -1-5% risk of complication, primarily infection or bleeding  - he needs to have a semen sample that shows no sperm before getting approval for unprotected intercourse.      Thank you for the kind consultation.    Time spent: 15 minutes spent on the date of the encounter doing chart review, history and exam, documentation and further activities as noted above.      Thang Lala MD   Urology  [facility]  Clinic Phone [phone]        Virtual Visit Details    Type of service:  Video Visit     Originating Location (pt. Location): Home    Distant Location (provider location):  On-site  Platform used for Video Visit: Mickey

## 2023-10-12 NOTE — NURSING NOTE
Is the patient currently in the state of [place]? YES    Visit mode:VIDEO    If the visit is dropped, the patient can be reconnected by: VIDEO VISIT: Send to e-mail at: [email or web address]    Will anyone else be joining the visit? NO  (If patient encounters technical issues they should call [phone])    How would you like to obtain your AVS? MyChart    Are changes needed to the allergy or medication list? No    Reason for visit: Consult    Wendy MULLIGAN

## 2023-12-14 ENCOUNTER — OFFICE VISIT (OUTPATIENT)
Dept: UROLOGY | Facility: CLINIC | Age: 33
End: 2023-12-14
Payer: COMMERCIAL

## 2023-12-14 VITALS — DIASTOLIC BLOOD PRESSURE: 92 MMHG | HEART RATE: 74 BPM | SYSTOLIC BLOOD PRESSURE: 149 MMHG

## 2023-12-14 DIAGNOSIS — Z30.2 ENCOUNTER FOR STERILIZATION: Primary | ICD-10-CM

## 2023-12-14 PROCEDURE — 55250 REMOVAL OF SPERM DUCT(S): CPT | Performed by: UROLOGY

## 2023-12-14 NOTE — PATIENT INSTRUCTIONS
Vasectomy Post-Op Care Instructions     You may go home after the procedure is completed. There may be some pain in your groin for 3 or 4 days after the operation. Some blood or yellow liquid may ooze from the cuts on the outside. The area around the cuts may swell and bruise. The sutures will dissolve on their own and do not require removal by the physician.    The first 48 hours after the procedure are crucial to healing. Generally, you may feel very good the day after the procedure, but that does not mean it is time to go back to normal activities. Resuming normal activities too soon is likely to cause internal bleeding and lots of pain.      Your provider may advise the following ways to care for yourself after the procedure:    Put an ice bag or package of frozen peas covered by a thin towel over the scrotum after the procedure. Leave the ice on the area for 30 minutes and then take it off for 30 minutes. Do this off and on for at least 24 hours.      Avoid all heavy lifting (greater than 10 pounds) for at least one week.    Wear a jockstrap or tight-fitting underwear for approximately one week to support the scrotum (testicles) and help reduce discomfort.    Take a pain reliever, such as Acetaminophen (Tylenol) or Ibuprofen (Advil), for any pain after the procedure. Your provider may prescribe a stronger pain medicine if it is needed.    You should be able to return to work in 48 hours, but strenuous activity should be avoided for two weeks.    Do not submerge the incision for 1 week following the procedure.    Ejaculation should be avoided for one week to allow the area to heal.    Follow-Up    You will need to have a negative post vasectomy analyses (no sperm seen) before you can discontinue birth control.    Semen Analysis   Schedule the post-vasectomy semen analysis three months after your vasectomy. You will need to ejaculate at least 20 times between your surgery and the first sample. Clinic staff will  contact your regarding your results via phone.    You will be given a container after the procedure. Collect the specimen at home by masturbation only (no lubrication, powders, saliva, or intercourse can be used) and bring it to the laboratory. You must have an appointment to drop off your specimen. The specimen needs to be delivered to the lab within 30-45 minutes.    Call [phone] with questions. For concerns or questions after hours or on weekends, please page the Urology Resident on-call: [phone].

## 2023-12-14 NOTE — PROGRESS NOTES
OFFICE VASECTOMY OPERATIVE NOTE  [facility]     DATE: 12/14/23  PATIENT: Rj Phillips    YOB: 1990    Rj Phillips is a 33 year old male.  He has 0 children and he wishes a vasectomy for birth control.  He has read the brochure and he has shaved himself.  I reviewed the vasectomy procedure with him explaining that it would be done with a local anesthetic given just in the location where the vasectomy would be done.  It would be done through incisions with the removal of segments of the vasa, cauterization of the ends, and burying the ends separate with sutures.      Pt. Understands:  -1/1000-1/3000 risk of future pregnancy even with perfectly done vasectomy  -vasectomy is a permanent procedure    -he may cryopreserve sperm if he wishes   -1-5% risk of post-vasectomy pain syndrome   -1-5% risk of complication, primarily infection or bleeding  - he needs to have a semen sample that shows no sperm before getting approval for unprotected intercourse.      Complications such as bleeding, infection, and damage to other tissues in the area were discussed. I recommended that an ice bag be placed on the scrotum off and on tonight to help reduce pain and swelling.      He was reminded that he was not sterile immediately after the vasectomy that it would take at least 20 ejaculations to empty the vas of any remaining sperm.  He was not to provide a semen sample until after the 20th ejaculation and not before 12 weeks after the vas. He was  to fulfill both of those requirements.   He understands it is his responsibility to find out the results of the vas before proceeding with intercourse without birth control protection.  Other items discussed were activity afterwards, returning to work, voluntary physical activity,  resuming sexual activity, clothing to wear, bathing, and care of the vas site and expected changes in the site as healing progresses.  After signing the permit, bilateral vasectomy was done as  described below.     ANESTHESIA: Local    DETAILS OF PROCEDURE: The risks of the procedure were explained in detail to the patient and informed consent was obtained. The patient was placed supine on the procedure table and the penis and scrotum were prepped and draped in the standard sterile fashion. The right vas deferens was isolated and brought up to the skin. 1% lidocaine local anesthesia was used to infiltrate the skin and the spermatic cord. A small incision was created and adventitial tissues were swept away from the vas. A 1 cm segment of the vas was excised and sent for pathology. The proximal and distal lumina of the vas were cauterized and then each segment was tied off in a knuckling-fashion with a 3-0 vicryl suture. Hemostasis was ensured and the segments were released back into the scrotum. Meticulous hemostasis was achieved. The skin was closed with 3-0 chromic suture in a horizontal mattress fashion. Next the left vas was brought to the skin and a vasectomy was performed in the similar fashion. The skin was closed with 3-0 chromic suture in a horizontal mattress fashion.    COMPLICATIONS: None    TAKE HOME MEDICATIONS: Tylenol every 6 hours, PRN    DISMISSAL INSTRUCTIONS:  - Ice pack to scrotum 15 to 20 minutes each hour awake for 36 to 40 hours.  - No strenuous activity or ejaculation for at least 7 days.  - No unprotected sexual activity until proven azoospermia on semen samples at 3 months.  - Referred to patient handout for normal postop expectations and indications to contact nurse or physician.    M.D.: Thang Lala MD

## 2023-12-14 NOTE — NURSING NOTE
Rj Phillips's goals for this visit include:   Chief Complaint   Patient presents with    Vasectomy     Voluntary sterilzation       He requests these members of his care team be copied on today's visit information:       PCP: No Ref-Primary, Physician    Referring Provider:  No referring provider defined for this encounter.    BP (!) 149/92 (BP Location: Right arm, Patient Position: Sitting, Cuff Size: Adult Regular)   Pulse 74     Do you need any medication refills at today's visit?     Starla Bush LPN on 12/14/2023 at 7:57 AM

## 2023-12-15 ENCOUNTER — NURSE TRIAGE (OUTPATIENT)
Dept: NURSING | Facility: CLINIC | Age: 33
End: 2023-12-15
Payer: COMMERCIAL

## 2023-12-15 NOTE — TELEPHONE ENCOUNTER
Spoke with patient on Dr. Lala's recommendations. He states his urine has now returned to a yellow color after pushing fluids today.  He continues to push fluids and will seek care over the weekend if fevers or chills occur. Patient to call/update as needed.  Ashley Jerez RN on 12/15/2023 at 4:15 PM

## 2023-12-15 NOTE — TELEPHONE ENCOUNTER
"  Nurse Triage SBAR    Is this a 2nd Level Triage? YES, LICENSED PRACTITIONER REVIEW IS REQUIRED    Situation: voiding hawaiian punch colored urine this am.  Pink tinged urine last night after procedure.   Slight burning, but no urgency or frequency.    Background: 12[facility]14/2023  [facility]  Urology Encounter for sterilization  Dx Vasectomy   Reason for Visit       Assessment:   voiding hawaiian punch colored urine this am.  Pink tinged urine last night after procedure.  Voiding with slight burning, but no urgency or frequency    Good fluid intake 2-3 quarts  Denies SOB or chest discomfort  Denies fall or injury  No issue with starting stream, strong stream    Protocol Recommended Disposition:   Routing to Urology for a call back    Recommendation:   Keep up with the fluids      Routed to provider      Reason for Disposition   Caller has URGENT question and triager unable to answer question    Additional Information   Negative: Sounds like a life-threatening emergency to the triager   Negative: Bright red, wide-spread, sunburn-like rash   Negative: SEVERE headache and after spinal (epidural) anesthesia   Negative: Vomiting and persists > 4 hours   Negative: Vomiting and abdomen looks much more swollen than usual   Negative: Drinking very little and dehydration suspected (e.g., no urine > 12 hours, very dry mouth, very lightheaded)   Negative: Patient sounds very sick or weak to the triager   Negative: Sounds like a serious complication to the triager   Negative: Fever > 100.4 F (38.0 C)   Negative: SEVERE post-op pain (e.g., excruciating, pain scale 8-10) that is not controlled with pain medications   Negative: Headache and after spinal (epidural) anesthesia and not severe   Negative: Fever present > 3 days (72 hours)    Answer Assessment - Initial Assessment Questions  1. SYMPTOM: \"What's the main symptom you're concerned about?\" (e.g., pain, fever, vomiting)      Blood " "in urine last night and this am  2. ONSET: \"When did   start?\"      Last night  3. SURGERY: \"What surgery did you have?\"      Vasectomy  4. DATE of SURGERY: \"When was the surgery?\"       12-  5. ANESTHESIA: \" What type of anesthesia did you have?\" (e.g., general, spinal, epidural, local)        6. PAIN: \"Is there any pain?\" If Yes, ask: \"How bad is it?\"  (Scale 1-10; or mild, moderate, severe)      No pain  7. FEVER: \"Do you have a fever?\" If Yes, ask: \"What is your temperature, how was it measured, and when did it start?\"      no  8. VOMITING: \"Is there any vomiting?\" If Yes, ask: \"How many times?\"      no  9. BLEEDING: \"Is there any bleeding?\" If Yes, ask: \"How much?\" and \"Where?\"      Voiding with blood in urine, last night color was pink tinged, this morning it is Hawaiian punch colored urine.   Slight burning, but no urgency or frequency noted.  10. OTHER SYMPTOMS: \"Do you have any other symptoms?\" (e.g., drainage from wound, painful urination, constipation)        Good fluid intake 2-3 quarts  Denies SOB or chest discomfort  Denies fall or injury  No issue with starting stream, strong    Protocols used: Post-Op Symptoms and [id number]    "

## 2024-01-19 ENCOUNTER — LAB (OUTPATIENT)
Dept: LAB | Facility: CLINIC | Age: 34
End: 2024-01-19
Payer: COMMERCIAL

## 2024-01-19 DIAGNOSIS — Z30.2 ENCOUNTER FOR STERILIZATION: ICD-10-CM

## 2024-01-19 LAB — SEMEN ANALYSIS P VAS PNL: NORMAL

## 2024-01-19 PROCEDURE — 2894A SEMEN ANALYSIS POST VASECTOMY: CPT

## 2024-03-10 ENCOUNTER — HEALTH MAINTENANCE LETTER (OUTPATIENT)
Age: 34
End: 2024-03-10

## 2024-04-30 ENCOUNTER — OFFICE VISIT (OUTPATIENT)
Dept: FAMILY MEDICINE | Facility: CLINIC | Age: 34
End: 2024-04-30
Payer: COMMERCIAL

## 2024-04-30 VITALS
WEIGHT: 224 LBS | HEIGHT: 73 IN | DIASTOLIC BLOOD PRESSURE: 78 MMHG | RESPIRATION RATE: 20 BRPM | TEMPERATURE: 97.8 F | OXYGEN SATURATION: 97 % | SYSTOLIC BLOOD PRESSURE: 131 MMHG | BODY MASS INDEX: 29.69 KG/M2 | HEART RATE: 50 BPM

## 2024-04-30 DIAGNOSIS — Z11.4 SCREENING FOR HIV (HUMAN IMMUNODEFICIENCY VIRUS): ICD-10-CM

## 2024-04-30 DIAGNOSIS — Z11.3 SCREEN FOR STD (SEXUALLY TRANSMITTED DISEASE): Primary | ICD-10-CM

## 2024-04-30 DIAGNOSIS — Z11.59 NEED FOR HEPATITIS C SCREENING TEST: ICD-10-CM

## 2024-04-30 PROCEDURE — 86803 HEPATITIS C AB TEST: CPT | Performed by: FAMILY MEDICINE

## 2024-04-30 PROCEDURE — 99213 OFFICE O/P EST LOW 20 MIN: CPT | Performed by: FAMILY MEDICINE

## 2024-04-30 PROCEDURE — 87491 CHLMYD TRACH DNA AMP PROBE: CPT | Performed by: FAMILY MEDICINE

## 2024-04-30 PROCEDURE — 86780 TREPONEMA PALLIDUM: CPT | Performed by: FAMILY MEDICINE

## 2024-04-30 PROCEDURE — 87591 N.GONORRHOEAE DNA AMP PROB: CPT | Performed by: FAMILY MEDICINE

## 2024-04-30 PROCEDURE — 87389 HIV-1 AG W/HIV-1&-2 AB AG IA: CPT | Performed by: FAMILY MEDICINE

## 2024-04-30 PROCEDURE — 36415 COLL VENOUS BLD VENIPUNCTURE: CPT | Performed by: FAMILY MEDICINE

## 2024-04-30 ASSESSMENT — PAIN SCALES - GENERAL: PAINLEVEL: NO PAIN (0)

## 2024-04-30 NOTE — PROGRESS NOTES
"  Assessment & Plan     Screen for STD (sexually transmitted disease)  Asymptomatic. Screen today with labs as below. Defers  exam.   - Chlamydia trachomatis/Neisseria gonorrhoeae by PCR  - Treponema Abs w Reflex to RPR and Titer    Screening for HIV (human immunodeficiency virus)  - HIV Antigen Antibody Combo    Need for hepatitis C screening test  - Hepatitis C Screen Reflex to HCV RNA Quant and Genotype    The risks, benefits and treatment options of prescribed medications or other treatments have been discussed with the patient. The patient verbalized their understanding and should call or follow up if no improvement or if they develop further problems.      Jimmie De La Rosa is a 33 year old, presenting for the following health issues:  STD (Testing wanted)        4/30/2024     2:47 PM   Additional Questions   Roomed by Miranda BECK     STD    History of Present Illness       Reason for visit:  Preventive    He eats 2-3 servings of fruits and vegetables daily.He consumes 0 sweetened beverage(s) daily.He exercises with enough effort to increase his heart rate 20 to 29 minutes per day.  He exercises with enough effort to increase his heart rate 3 or less days per week. He is missing 3 dose(s) of medications per week.       New relationship and girlfriend wants him to be checked.   No symptoms.   No penile lesions.   No discharge from the penis.   Sexually active with the girlfriend.   Defers genital exam today.       Review of Systems  Constitutional, HEENT, cardiovascular, pulmonary, gi and gu systems are negative, except as otherwise noted.      Objective    /78 (BP Location: Right arm, Patient Position: Chair, Cuff Size: Adult Regular)   Pulse 50   Temp 97.8  F (36.6  C) (Oral)   Resp 20   Ht 1.861 m (6' 1.25\")   Wt 101.6 kg (224 lb)   SpO2 97%   BMI 29.35 kg/m    Body mass index is 29.35 kg/m .  Physical Exam   General: alert, cooperative, no acute distress   CV: RRR, no murmur  Resp: non-labored " breathing, clear to auscultation, no wheezing or rales   Abdomen: Soft, non-tender, no guarding.   : exam deferred by patient.       Signed Electronically by: Francis Díaz DO

## 2024-05-01 LAB
C TRACH DNA SPEC QL PROBE+SIG AMP: NEGATIVE
HCV AB SERPL QL IA: NONREACTIVE
HIV 1+2 AB+HIV1 P24 AG SERPL QL IA: NONREACTIVE
N GONORRHOEA DNA SPEC QL NAA+PROBE: NEGATIVE
T PALLIDUM AB SER QL: NONREACTIVE

## 2024-09-04 ENCOUNTER — HOSPITAL ENCOUNTER (EMERGENCY)
Facility: CLINIC | Age: 34
Discharge: HOME | End: 2024-09-04
Payer: COMMERCIAL

## 2024-09-04 VITALS
RESPIRATION RATE: 16 BRPM | DIASTOLIC BLOOD PRESSURE: 80 MMHG | OXYGEN SATURATION: 96 % | SYSTOLIC BLOOD PRESSURE: 125 MMHG | TEMPERATURE: 97.6 F | HEART RATE: 83 BPM

## 2024-09-04 DIAGNOSIS — G56.02 CARPAL TUNNEL SYNDROME OF LEFT WRIST: ICD-10-CM

## 2024-09-04 PROCEDURE — G0463 HOSPITAL OUTPT CLINIC VISIT: HCPCS

## 2024-09-04 PROCEDURE — 99213 OFFICE O/P EST LOW 20 MIN: CPT

## 2024-09-04 RX ORDER — NAPROXEN 500 MG/1
500 TABLET ORAL 2 TIMES DAILY WITH MEALS
Qty: 14 TABLET | Refills: 0 | Status: SHIPPED | OUTPATIENT
Start: 2024-09-04

## 2024-09-04 ASSESSMENT — COLUMBIA-SUICIDE SEVERITY RATING SCALE - C-SSRS
1. IN THE PAST MONTH, HAVE YOU WISHED YOU WERE DEAD OR WISHED YOU COULD GO TO SLEEP AND NOT WAKE UP?: NO
6. HAVE YOU EVER DONE ANYTHING, STARTED TO DO ANYTHING, OR PREPARED TO DO ANYTHING TO END YOUR LIFE?: NO
2. HAVE YOU ACTUALLY HAD ANY THOUGHTS OF KILLING YOURSELF IN THE PAST MONTH?: NO

## 2024-09-04 NOTE — ED PROVIDER NOTES
History     Chief Complaint   Patient presents with    Finger     Left ring finger seems to be falling asleep past 3 weeks. No Known injury. Typing is annoying. Can close hand. Has noticed left wrist / arm and hand seems to swell up easier but no known injury. Does not seem to radiate     HPI  Rj Phillips is a 34 year old male who presents for evaluation of left ring finger numbness and occasional left wrist pain ongoing for the past 3 weeks.  This began insidiously and was initially intermittent but now seems like the tip of left ring finger is always tingling.  Denies any known injury or trauma to the area.  Occasionally experiences shooting pains into the wrist with certain activities like riding his dirt bike.  Denies any other areas of numbness or pain.  Reports problems with carpal tunnel in the past, did not receive any treatment for this.     Allergies:  No Known Allergies    Problem List:    Patient Active Problem List    Diagnosis Date Noted    Rupture of anterior cruciate ligament of right knee, subsequent encounter 03/01/2023     Priority: Medium    Hypothyroidism 12/22/2022     Priority: Medium    Acid reflux 12/22/2022     Priority: Medium    Seasonal allergic rhinitis 12/22/2022     Priority: Medium        Past Medical History:    No past medical history on file.    Past Surgical History:    Past Surgical History:   Procedure Laterality Date    NISSEN FUNDOPLICATION      ROTATOR CUFF REPAIR RT/LT Right        Family History:    No family history on file.    Social History:  Marital Status:  Single [1]  Social History     Tobacco Use    Smoking status: Never    Smokeless tobacco: Former     Types: Chew   Vaping Use    Vaping status: Never Used        Medications:    naproxen (NAPROSYN) 500 MG tablet  fexofenadine (ALLEGRA) 180 MG tablet  levothyroxine (SYNTHROID/LEVOTHROID) 25 MCG tablet  multivitamin with iron (CHROMAGEN) TABS half-tab          Review of Systems  Pertinent review of systems as  documented per HPI above.    Physical Exam   BP: 125/80  Pulse: 83  Temp: 97.6  F (36.4  C)  Resp: 16  SpO2: 96 %      Physical Exam  Vitals and nursing note reviewed.   Constitutional:       General: He is not in acute distress.     Appearance: Normal appearance. He is not ill-appearing, toxic-appearing or diaphoretic.   Cardiovascular:      Rate and Rhythm: Normal rate.   Musculoskeletal:      Left wrist: No swelling, deformity, effusion, lacerations, tenderness, bony tenderness, snuff box tenderness or crepitus. Normal range of motion. Normal pulse.      Left hand: No swelling, deformity, lacerations, tenderness or bony tenderness. Normal range of motion. Normal strength. Normal sensation. Normal capillary refill. Normal pulse.      Comments: Positive Phalen's test   Skin:     General: Skin is warm and dry.      Capillary Refill: Capillary refill takes less than 2 seconds.   Neurological:      General: No focal deficit present.      Mental Status: He is alert and oriented to person, place, and time.   Psychiatric:         Mood and Affect: Mood normal.         Behavior: Behavior normal.             Assessments & Plan (with Medical Decision Making)     I have reviewed the nursing notes.    I have reviewed the findings, diagnosis, plan and need for follow up with the patient.  34 year old male who presents for evaluation of left ring finger numbness and occasional left wrist pain ongoing for the past 3 weeks.  This began insidiously and was initially intermittent but now seems like the tip of left ring finger is always tingling.  Denies any known injury or trauma to the area.  Occasionally experiences shooting pains into the wrist with certain activities like riding his dirt bike.  Denies any other areas of numbness or pain.  Reports problems with carpal tunnel in the past, did not receive any treatment for this.     Patient afebrile on arrival with [facility]. Exam above with signs suggestive of carpal tunnel syndrome.  Discussed supportive cares with patient including stretches, abstaining from repetitive movements that exacerbate symptoms, and wrist brace offered to wear at nighttime. Advised that is symptoms ongoing despite recommendations that he follow up with PCP or orthopedics for ongoing management. All questions answered.  Patient verbalizes understanding and agreement with the above plan.    Disclaimer: This note consists of symbols derived from keyboarding, dictation, and/or voice recognition software. As a result, there may be errors in the script that have gone undetected.  Please consider this when interpreting information found in the chart.        New Prescriptions    NAPROXEN (NAPROSYN) 500 MG TABLET    Take 1 tablet (500 mg) by mouth 2 times daily (with meals).       Final diagnoses:   Carpal tunnel syndrome of left wrist       9/4/2024   [facility] EMERGENCY DEPT       Malia Conte PA-C  09/05/24 [id number]

## 2024-09-04 NOTE — DISCHARGE INSTRUCTIONS
You were seen today for numbness in your left ring finger and occasional wrist pain.  I suspect this is likely related to nerve impingement or carpal tunnel.  I recommend that you try wearing the wrist brace at nighttime for the next week to prevent your wrist from hyperextending in your sleep.  You can also take naproxen to help with the inflammation, do not take any ibuprofen with this.  I also placed a referral to orthopedics for you to follow-up with them if your symptoms do not get better, they will call you to schedule an appointment.

## 2024-09-10 ENCOUNTER — TELEPHONE (OUTPATIENT)
Dept: ALLERGY | Facility: CLINIC | Age: 34
End: 2024-09-10
Payer: COMMERCIAL

## 2024-09-10 NOTE — TELEPHONE ENCOUNTER
Called and spoke with pt. Informed him that he would need to have a consult with the provider and testing occurs during the appointment. Pt states he was told by [facility] staff that we ([facility]) would take their serums and give him his injections. Informed him that we have not taken outside serums for years. And that [facility] does not either. Informed of next available to see Dr Cintron and pt declined to make an appointment.     No further questions.     Estefany MIRANDA RN  Specialty/Allergy Clinics

## 2024-09-10 NOTE — PROGRESS NOTES
ASSESSMENT & PLAN     Today we discussed the underlying etiology/pathology of patient's   1. Numbness of finger- left 4th digit      Discussed diagnosis and treatment options with the patient today. A shared decision making model was used. The patient's values and choices were respected. The following represents what was discussed and decided upon by the provider and the patient.   -We discussed the patient has abnormal sensation of the left hand ring finger.  We discussed that symptoms do not follow a dermatomal pattern and there is no evidence of trauma or nerve injury directly other than some recent heavy labor/work with the hands.  - Physical exam today is completely benign other than subjective numbness and tingling of the finger with normal motor tone and function of the digit and his hand.  - At this time patient likely sustained some type of compression type event irritating the peripheral nerves of the ring finger which should resolve with time.  - We discussed utilizing gabapentin to help decrease symptoms which is being declined at this time.  There is no need for immobilization or further treatment.  - Send symptoms are so acute EMG/nerve conduction study testing would not likely be positive or beneficial at this time.  - Patient will monitor his symptoms over the next couple months and if not slowly improving he should come back for repeat assessment as well as consideration for more advanced testing.  -Patient does not need to wear nocturnal wrist braces as there is no evidence of large nerve involvement including no abnormality of the median nerve, ulnar nerve, radial nerve.    -Call direct clinic number [[phone]] at any time with questions or concerns in regards to your recent office visit with me.     To Costa PA-C  [facility]    This note was completed in part using a voice recognition software, any grammatical or context distortion are unintentional and inherent  to the software.           SUBJECTIVE  Rj Phillips is a/an 34 year old male who is seen in consultation at the request of  Malia Conte PA-C for evaluation of left ring finger numbness. The patient is seen by themselves.    Onset: 4 week(s) ago. Reports insidious onset without acute precipitating event.  Patient was doing some labor-intensive activities with his hands including moving wood logs.  Location of Pain: None, but numbness left ring finger  Rating of Pain at worst: 0/10  Rating of Pain Currently: 0/10  Worsened by: usage of the hands, grasping heavy things can cause numbness and pain to the volar wrist.  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 Denies neck injuries/pain  Relevant surgical history: NO  Social history: social history: works at [employer] as an , also enjoys dirt biking- this has been affected by his wrist/hand numbness.     No past medical history on file.  Social History     Socioeconomic History    Marital status: Single   Tobacco Use    Smoking status: Never    Smokeless tobacco: Former     Types: Chew   Vaping Use    Vaping status: Never Used     Social Determinants of Health     Financial Resource Strain: Low Risk  (5/31/2024)    [facility]    Financial Resource Strain     Difficulty of Paying Living Expenses: 3   Food Insecurity: No Food Insecurity (5/31/2024)    [facility]    Food Insecurity     Worried About Running Out of Food in the Last Year: 1   Transportation Needs: No Transportation Needs (5/31/2024)    [facility]    Transportation Needs     Lack of Transportation (Medical): 1   Social Connections:  Socially Integrated (5/31/2024)    Received from [employer]    Social Connections     Frequency of Communication with Friends and Family: 0   Interpersonal Safety: Low Risk  (4/30/2024)    Interpersonal Safety     Do you feel physically and emotionally safe where you currently live?: Yes     Within the past 12 months, have you been hit, slapped, kicked or otherwise physically hurt by someone?: No     Within the past 12 months, have you been humiliated or emotionally abused in other ways by your partner or ex-partner?: No   Housing Stability: Low Risk  (5/31/2024)    Received from [employer]    Housing Stability     Unable to Pay for Housing in the Last Year: 1         Patient's past medical, surgical, social, and family histories were personally reviewed today and no changes are noted.    REVIEW OF SYSTEMS:  10 point ROS is negative other than symptoms noted above in HPI, Past Medical History or as stated below  Constitutional: NEGATIVE for fever, chills, change in weight  Skin: NEGATIVE for worrisome rashes, moles or lesions  GI/: NEGATIVE for bowel or bladder changes  Neuro: NEGATIVE for weakness, dizziness or paresthesias    OBJECTIVE:  Vital signs as noted in EPIC for 9/11/2024  General: healthy, alert and in no distress  HEENT: no scleral icterus or conjunctival erythema  Skin: no suspicious lesions or rash. No jaundice.  CV: no pedal edema  Resp: normal respiratory effort without conversational dyspnea   Psych: normal mood and affect  Gait: normal steady gait with appropriate coordination and balance  Neuro: Normal light sensory exam of lower extremity      MSK:  Exam shows a pleasant 34-year-old male who ambulates weightbearing without assistive device per examination both hands show no obvious bruising, swelling or ecchymosis.  No atrophy.  Patient has significant callus formation on the volar aspect of the MCP joints from heavy labor.   Patient has full finger extension and flexion without deformity or lag.  Flexor digitorum profundus and superficialis are intact of all digits with no motor weakness.  Patient has normal muscle tone of the intrinsics of the hands bilaterally.  Normal strength of the wrist.  No thenar or hypothenar atrophy.  No triggering, locking or catching of any digit.  No pain to palpation throughout the entire left hand including through the entire length volar and dorsal of the ring finger.  Patient subjectively states that he has decreased sensation across the entire volar aspect of his ring finger starting at the MCP joint going to the tip of the finger.  Dorsally he only has numbness from the MCP joint to the PIP joint.  Cap refill is less than 2 seconds of all digits.  No skin breakdown, rashes or lesions noted throughout the hands.  Radial pulses plus and symmetric.  Patient has negative Tinel and Phalen's.  Negative cubital tunnel percussion test and flexion test.          Patient's conditions were thoroughly discussed during today's visit with total time reviewing patient's previous medical records/history/radiology, face-to-face examination and discussion and plan of care with the patient and documentation being 20 minutes for today's clinical visit  To Costa PA-C  [facility]    This note was completed in part using a voice recognition software, any grammatical or context distortion are unintentional and inherent to the software.

## 2024-09-10 NOTE — TELEPHONE ENCOUNTER
[facility] Call Center    Phone Message    May a detailed message be left on voicemail: yes     Reason for Call: Patient had allergy tests done at [facility] and now wants tto have allergy shots done at [facility] based on [facility] allergy tests - writer unsure if we do this - please call back [phone] Thank you    Action Taken: Other: [place] ALL    Travel Screening: Not Applicable     Date of Service:

## 2024-09-11 ENCOUNTER — OFFICE VISIT (OUTPATIENT)
Dept: ORTHOPEDICS | Facility: CLINIC | Age: 34
End: 2024-09-11
Payer: COMMERCIAL

## 2024-09-11 VITALS — SYSTOLIC BLOOD PRESSURE: 124 MMHG | BODY MASS INDEX: 29.46 KG/M2 | WEIGHT: 224.8 LBS | DIASTOLIC BLOOD PRESSURE: 82 MMHG

## 2024-09-11 DIAGNOSIS — R20.0 NUMBNESS OF FINGER: ICD-10-CM

## 2024-09-11 PROCEDURE — 99202 OFFICE O/P NEW SF 15 MIN: CPT | Performed by: PHYSICIAN ASSISTANT

## 2024-09-11 NOTE — LETTER
9/11/2024      Rj Phillips  [address]  [place] [address]      Dear Colleague,    Thank you for referring your patient, Rj Phillips, to the [facility]. Please see a copy of my visit note below.    ASSESSMENT & PLAN     Today we discussed the underlying etiology/pathology of patient's   1. Numbness of finger- left 4th digit      Discussed diagnosis and treatment options with the patient today. A shared decision making model was used. The patient's values and choices were respected. The following represents what was discussed and decided upon by the provider and the patient.   -We discussed the patient has abnormal sensation of the left hand ring finger.  We discussed that symptoms do not follow a dermatomal pattern and there is no evidence of trauma or nerve injury directly other than some recent heavy labor/work with the hands.  - Physical exam today is completely benign other than subjective numbness and tingling of the finger with normal motor tone and function of the digit and his hand.  - At this time patient likely sustained some type of compression type event irritating the peripheral nerves of the ring finger which should resolve with time.  - We discussed utilizing gabapentin to help decrease symptoms which is being declined at this time.  There is no need for immobilization or further treatment.  - Send symptoms are so acute EMG/nerve conduction study testing would not likely be positive or beneficial at this time.  - Patient will monitor his symptoms over the next couple months and if not slowly improving he should come back for repeat assessment as well as consideration for more advanced testing.  -Patient does not need to wear nocturnal wrist braces as there is no evidence of large nerve involvement including no abnormality of the median nerve, ulnar nerve, radial nerve.    -Call direct clinic number [[phone]] at any time with  questions or concerns in regards to your recent office visit with me.     To Costa PA-C  [facility]    This note was completed in part using a voice recognition software, any grammatical or context distortion are unintentional and inherent to the software.           ONUR Phillips is a/an 34 year old male who is seen in consultation at the request of  Malia Conte PA-C for evaluation of left ring finger numbness. The patient is seen by themselves.    Onset: 4 week(s) ago. Reports insidious onset without acute precipitating event.  Patient was doing some labor-intensive activities with his hands including moving wood logs.  Location of Pain: None, but numbness left ring finger  Rating of Pain at worst: 0/10  Rating of Pain Currently: 0/10  Worsened by: usage of the hands, grasping heavy things can cause numbness and pain to the volar wrist.  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 Denies neck injuries/pain  Relevant surgical history: NO  Social history: social history: works at [employer] as an , also enjoys dirt biking- this has been affected by his wrist/hand numbness.     No past medical history on file.  Social History     Socioeconomic History     Marital status: Single   Tobacco Use     Smoking status: Never     Smokeless tobacco: Former     Types: Chew   Vaping Use     Vaping status: Never Used     Social Determinants of Health     Financial Resource Strain: Low Risk  (5/31/2024)    Received from [facility]    Financial Resource Strain      Difficulty of Paying Living Expenses: 3   Food Insecurity: No Food Insecurity (5/31/2024)    Received from [facility] [employer]  Insecurity      Worried About Running Out of Food in the Last Year: 1   Transportation Needs: No Transportation Needs (5/31/2024)    [facility]    Transportation Needs      Lack of Transportation (Medical): 1   Social Connections: Socially Integrated (5/31/2024)    Received from [facility]    Social Connections      Frequency of Communication with Friends and Family: 0   Interpersonal Safety: Low Risk  (4/30/2024)    Interpersonal Safety      Do you feel physically and emotionally safe where you currently live?: Yes      Within the past 12 months, have you been hit, slapped, kicked or otherwise physically hurt by someone?: No      Within the past 12 months, have you been humiliated or emotionally abused in other ways by your partner or ex-partner?: No   Housing Stability: Low Risk  (5/31/2024)    Received from [facility]    Housing Stability      Unable to Pay for Housing in the Last Year: 1         Patient's past medical, surgical, social, and family histories were personally reviewed today and no changes are noted.    REVIEW OF SYSTEMS:  10 point ROS is negative other than symptoms noted above in HPI, Past Medical History or as stated below  Constitutional: NEGATIVE for fever, chills, change in weight  Skin: NEGATIVE for worrisome rashes, moles or lesions  GI/: NEGATIVE for bowel or bladder changes  Neuro: NEGATIVE for weakness, dizziness or paresthesias    OBJECTIVE:  Vital signs as noted in EPIC for 9/11/2024  General: healthy, alert and in no distress  HEENT: no scleral icterus or conjunctival erythema  Skin: no suspicious lesions or rash. No jaundice.  CV: no pedal edema  Resp: normal respiratory effort without conversational dyspnea   Psych: normal mood and affect  Gait: normal steady gait with appropriate coordination and balance  Neuro: Normal light sensory exam of lower  extremity      MSK:  Exam shows a pleasant 34-year-old male who ambulates weightbearing without assistive device per examination both hands show no obvious bruising, swelling or ecchymosis.  No atrophy.  Patient has significant callus formation on the volar aspect of the MCP joints from heavy labor.  Patient has full finger extension and flexion without deformity or lag.  Flexor digitorum profundus and superficialis are intact of all digits with no motor weakness.  Patient has normal muscle tone of the intrinsics of the hands bilaterally.  Normal strength of the wrist.  No thenar or hypothenar atrophy.  No triggering, locking or catching of any digit.  No pain to palpation throughout the entire left hand including through the entire length volar and dorsal of the ring finger.  Patient subjectively states that he has decreased sensation across the entire volar aspect of his ring finger starting at the MCP joint going to the tip of the finger.  Dorsally he only has numbness from the MCP joint to the PIP joint.  Cap refill is less than 2 seconds of all digits.  No skin breakdown, rashes or lesions noted throughout the hands.  Radial pulses plus and symmetric.  Patient has negative Tinel and Phalen's.  Negative cubital tunnel percussion test and flexion test.          Patient's conditions were thoroughly discussed during today's visit with total time reviewing patient's previous medical records/history/radiology, face-to-face examination and discussion and plan of care with the patient and documentation being 20 minutes for today's clinical visit  To Costa PA-C  [facility]    This note was completed in part using a voice recognition software, any grammatical or context distortion are unintentional and inherent to the software.       Again, thank you for allowing me to participate in the care of your patient.        Sincerely,        To Costa PA-C

## 2024-09-11 NOTE — PATIENT INSTRUCTIONS
Today we discussed the underlying etiology/pathology of patient's   1. Numbness of finger- left 4th digit      Discussed diagnosis and treatment options with the patient today. A shared decision making model was used. The patient's values and choices were respected. The following represents what was discussed and decided upon by the provider and the patient.   -We discussed the patient has abnormal sensation of the left hand ring finger.  We discussed that symptoms do not follow a dermatomal pattern and there is no evidence of trauma or nerve injury directly other than some recent heavy labor/work with the hands.  - Physical exam today is completely benign other than subjective numbness and tingling of the finger with normal motor tone and function of the digit and his hand.  - At this time patient likely sustained some type of compression type event irritating the peripheral nerves of the ring finger which should resolve with time.  - We discussed utilizing gabapentin to help decrease symptoms which is being declined at this time.  There is no need for immobilization or further treatment.  - Send symptoms are so acute EMG/nerve conduction study testing would not likely be positive or beneficial at this time.  - Patient will monitor his symptoms over the next couple months and if not slowly improving he should come back for repeat assessment as well as consideration for more advanced testing.  -Patient does not need to wear nocturnal wrist braces as there is no evidence of large nerve involvement including no abnormality of the median nerve, ulnar nerve, radial nerve.    -Call direct clinic number [[phone]] at any time with questions or concerns in regards to your recent office visit with me.     To Costa PA-C  [facility]    This note was completed in part using a voice recognition software, any grammatical or context distortion are unintentional and inherent to the  software.